# Patient Record
Sex: FEMALE | Race: WHITE | NOT HISPANIC OR LATINO | Employment: FULL TIME | URBAN - METROPOLITAN AREA
[De-identification: names, ages, dates, MRNs, and addresses within clinical notes are randomized per-mention and may not be internally consistent; named-entity substitution may affect disease eponyms.]

---

## 2019-07-14 ENCOUNTER — COMMUNICATION - HEALTHEAST (OUTPATIENT)
Dept: SCHEDULING | Facility: CLINIC | Age: 34
End: 2019-07-14

## 2021-05-25 ENCOUNTER — RECORDS - HEALTHEAST (OUTPATIENT)
Dept: ADMINISTRATIVE | Facility: CLINIC | Age: 36
End: 2021-05-25

## 2021-05-30 NOTE — TELEPHONE ENCOUNTER
"15 Weeks pregnant, YARELI 1/3/2020    Abdominal pain began last night at 2000, gradual onset, and is getting worse. Is now waking her up from sleep and she is feeling like she can't sleep.    Thinks may be round ligament pain, following some research, but is worried because it is getting much worse    Rates pain 6/10 and increasing  -Located: was on both sides of pelvic area, now on primarily left  -Feels \"pully or stretchy and then every now and then a sharp stabbing pain\"  -Does not radiate to any other part of the body  -Sitting and standing feel better than laying down, laying down makes it worse    Possible constipation, it has been 2 days since last BM. Patient states she usually goes every day.     No vaginal bleeding   No nausea/vomiting   No fever  No diarrhea  No urinary symptoms  No abdominal injury    Triaged to a disposition of Go to ED Now. Patient is agreeable. Patient is an [facility] Patient.     Reason for Disposition    MODERATE-SEVERE abdominal pain (e.g., interferes with normal activities, awakens from sleep)    Protocols used: PREGNANCY - ABDOMINAL PAIN LESS THAN 20 WEEKS NISA Candelaria RN Triage Nurse Advisor [facility]    "

## 2021-06-02 ENCOUNTER — RECORDS - HEALTHEAST (OUTPATIENT)
Dept: ADMINISTRATIVE | Facility: CLINIC | Age: 36
End: 2021-06-02

## 2022-03-28 ENCOUNTER — HOSPITAL ENCOUNTER (EMERGENCY)
Facility: CLINIC | Age: 37
Discharge: HOME | End: 2022-03-28
Attending: STUDENT IN AN ORGANIZED HEALTH CARE EDUCATION/TRAINING PROGRAM | Admitting: STUDENT IN AN ORGANIZED HEALTH CARE EDUCATION/TRAINING PROGRAM
Payer: COMMERCIAL

## 2022-03-28 VITALS
SYSTOLIC BLOOD PRESSURE: 123 MMHG | TEMPERATURE: 98.6 F | WEIGHT: 126.8 LBS | DIASTOLIC BLOOD PRESSURE: 82 MMHG | HEART RATE: 81 BPM | BODY MASS INDEX: 22.47 KG/M2 | RESPIRATION RATE: 17 BRPM | HEIGHT: 63 IN | OXYGEN SATURATION: 100 %

## 2022-03-28 DIAGNOSIS — R11.0 NAUSEA: Primary | ICD-10-CM

## 2022-03-28 DIAGNOSIS — R00.2 PALPITATIONS: ICD-10-CM

## 2022-03-28 LAB
ANION GAP SERPL CALCULATED.3IONS-SCNC: 9 MMOL/L (ref 5–18)
ATRIAL RATE - MUSE: 79 BPM
BUN SERPL-MCNC: 6 MG/DL (ref 8–22)
CALCIUM SERPL-MCNC: 9.5 MG/DL (ref 8.5–10.5)
CHLORIDE BLD-SCNC: 110 MMOL/L (ref 98–107)
CO2 SERPL-SCNC: 21 MMOL/L (ref 22–31)
CREAT SERPL-MCNC: 0.67 MG/DL (ref 0.6–1.1)
DIASTOLIC BLOOD PRESSURE - MUSE: NORMAL MMHG
ERYTHROCYTE [DISTWIDTH] IN BLOOD BY AUTOMATED COUNT: 12.4 % (ref 10–15)
GFR SERPL CREATININE-BSD FRML MDRD: >90 ML/MIN/1.73M2
GLUCOSE BLD-MCNC: 96 MG/DL (ref 70–125)
HCG SERPL-ACNC: 166 MLU/ML (ref 0–4)
HCT VFR BLD AUTO: 40.7 % (ref 35–47)
HGB BLD-MCNC: 13.6 G/DL (ref 11.7–15.7)
INTERPRETATION ECG - MUSE: NORMAL
MCH RBC QN AUTO: 29.6 PG (ref 26.5–33)
MCHC RBC AUTO-ENTMCNC: 33.4 G/DL (ref 31.5–36.5)
MCV RBC AUTO: 89 FL (ref 78–100)
P AXIS - MUSE: 68 DEGREES
PLATELET # BLD AUTO: 266 10E3/UL (ref 150–450)
POTASSIUM BLD-SCNC: 3.7 MMOL/L (ref 3.5–5)
PR INTERVAL - MUSE: 146 MS
QRS DURATION - MUSE: 88 MS
QT - MUSE: 378 MS
QTC - MUSE: 433 MS
R AXIS - MUSE: 72 DEGREES
RBC # BLD AUTO: 4.59 10E6/UL (ref 3.8–5.2)
SODIUM SERPL-SCNC: 140 MMOL/L (ref 136–145)
SYSTOLIC BLOOD PRESSURE - MUSE: NORMAL MMHG
T AXIS - MUSE: 29 DEGREES
VENTRICULAR RATE- MUSE: 79 BPM
WBC # BLD AUTO: 7.4 10E3/UL (ref 4–11)

## 2022-03-28 PROCEDURE — 258N000003 HC RX IP 258 OP 636: Performed by: STUDENT IN AN ORGANIZED HEALTH CARE EDUCATION/TRAINING PROGRAM

## 2022-03-28 PROCEDURE — 84702 CHORIONIC GONADOTROPIN TEST: CPT | Performed by: STUDENT IN AN ORGANIZED HEALTH CARE EDUCATION/TRAINING PROGRAM

## 2022-03-28 PROCEDURE — 99284 EMERGENCY DEPT VISIT MOD MDM: CPT | Mod: 25

## 2022-03-28 PROCEDURE — 85027 COMPLETE CBC AUTOMATED: CPT | Performed by: STUDENT IN AN ORGANIZED HEALTH CARE EDUCATION/TRAINING PROGRAM

## 2022-03-28 PROCEDURE — 82310 ASSAY OF CALCIUM: CPT | Performed by: STUDENT IN AN ORGANIZED HEALTH CARE EDUCATION/TRAINING PROGRAM

## 2022-03-28 PROCEDURE — 36415 COLL VENOUS BLD VENIPUNCTURE: CPT | Performed by: STUDENT IN AN ORGANIZED HEALTH CARE EDUCATION/TRAINING PROGRAM

## 2022-03-28 PROCEDURE — 93005 ELECTROCARDIOGRAM TRACING: CPT

## 2022-03-28 PROCEDURE — 93005 ELECTROCARDIOGRAM TRACING: CPT | Performed by: STUDENT IN AN ORGANIZED HEALTH CARE EDUCATION/TRAINING PROGRAM

## 2022-03-28 RX ORDER — DOXYLAMINE SUCCINATE AND PYRIDOXINE HYDROCHLORIDE, DELAYED RELEASE TABLETS 10 MG/10 MG 10; 10 MG/1; MG/1
1 TABLET, DELAYED RELEASE ORAL AT BEDTIME
Qty: 14 TABLET | Refills: 0 | Status: SHIPPED | OUTPATIENT
Start: 2022-03-28 | End: 2022-04-11

## 2022-03-28 RX ADMIN — SODIUM CHLORIDE 1000 ML: 9 INJECTION, SOLUTION INTRAVENOUS at 12:04

## 2022-03-28 NOTE — ED PROVIDER NOTES
EMERGENCY DEPARTMENT ENCOUNTER       ED Course & Medical Decision Making   8:44 AM I met with the patient, obtained history, performed an initial exam, and discussed options and plan for diagnostics and treatment here in the ED.  12:32 PM Rechecked patient and updated on results. Discussed plan for discharge - patient agreeable.    Final Impression  37 year old female presents for evaluation of some palpitations and feelings of fast racing heart (but did not take pulse rate) at home earlier today.  Patient is about 5 weeks pregnant, only recently discovered she was pregnant on 3/26, has not yet seen OB/GYN, though previously followed with Dr. Khoury for her prior pregnancy.  Did report some nausea vomiting with prior pregnancy, though short-lived and occurred later than this.  Denies any lower abdominal pain or vaginal bleeding.  Heart rate in the 70s on her EKG here, shows normal sinus rhythm, no signs of acute ischemia.  Heart rate in the 70s-90s throughout my visit with patient, no acute distress.  CBC and BMP grossly normal.  Quant hCG returned at 166.  Quant was discussed with patient, on the lower end of normal, though still technically within the normal range for her approximate 5 weeks gestation, discussed that the more important thing was that it is doubling every 48-72 hours.  No current nausea or vomiting.  Given 1 L fluid bolus while in the ED.  Did discuss first trialing with likely just to see if this helps with her morning sickness at all, though if she fails that, may likely need to progress to Zofran.  Patient agreeable to trial of Diclegis and follow-up in the OB clinic.  Will discharge home.    Prior to making a final disposition on this patient the results of patient's tests and other diagnostic studies were discussed with the patient. All questions were answered. Patient expressed understanding of the plan and was amenable to it.    Medications   0.9% sodium chloride BOLUS (0 mLs Intravenous  Stopped 3/28/22 1206)       Final Impression     1. Nausea    2. Palpitations          Chief Complaint     Chief Complaint   Patient presents with     Irregular Heart Beat     Patient experiencing heart palpations with intermittent tachycardia and N/V beginning at 0300. Pt 5 weeks pregnant. C/o cramping. No vaginal bleeding.     HPI     Fabiana Lazar is a 37 year old female who presents for evaluation of palpitations. Patient reports she is approximately 5 weeks pregnant, had positive home pregnancy test on 3/26/22 (2 days ago). Over the last few days, she reports persistent and severe nausea with vomiting that started this morning around 2:00 AM. She has been unable to keep down most oral intake the last few days. This morning, she reports she had a hot flash and was feeling heart palpitations this morning, which she attributed to the vomiting. Patient called the OB/Gyn RN triage line this morning for her morning sickness, and reports she was advised to come to the ED due to the palpitations. She denies any cardiac history. She does endorse mild abdominal cramping, but reports her nausea has resolved at this time. She notes she had around 3 weeks of mild nausea in her previous pregnancy, but never needed medications. She denies any shortness of breath or vaginal bleeding. She reports she is otherwise healthy and followed with Dr. Khoury during her previous pregnancy. She has not set up an OB/GYN yet during this previous pregnancy, but plans to see Dr. Khoury. She denies any other concerns.     I, Lul Prajapati am serving as a scribe to document services personally performed by Dr. Dex Mata MD, based on my observation and the provider's statements to me. I, Dr. Dex Mata MD attest that Lul Prajapati is acting in a scribe capacity, has observed my performance of the services and has documented them in accordance with my direction.    Past Medical History     History reviewed. No pertinent past  "medical history.  History reviewed. No pertinent surgical history.  History reviewed. No pertinent family history.   Social History     Tobacco Use     Smoking status: None     Smokeless tobacco: None   Substance Use Topics     Alcohol use: None     Drug use: None     Allergies   Allergen Reactions     Cefzil [Cefprozil] Shortness Of Breath       Relevant past medical, surgical, family and social history as documented above, has been reviewed and discussed with patient. No changes or additions, unless otherwise noted in the HPI.    Current Medications     Doxylamine-Pyridoxine (DICLEGIS) 10-10 MG TBEC        Review of Systems     Constitutional: Denies fever, chills  Eyes: Denies visual changes  Respiratory: Denies cough or shortness of breath    Cardiovascular: Denies chest pain or leg swelling. Positive for palpitations.  GI:  Denies dark, bloody stools. Positive for abdominal pain (cramping), nausea, vomiting.  : Denies hematuria, dysuria, or flank pain  Musculoskeletal: Denies any new back pain  Skin: Denies rashes or wound  Neurologic: Denies current headache, new weakness, focal weakness    Remainder of systems reviewed, unless noted in HPI all others negative.    Physical Exam     /82   Pulse 81   Temp 98.6  F (37  C) (Oral)   Resp 17   Ht 1.6 m (5' 3\")   Wt 57.5 kg (126 lb 12.8 oz)   SpO2 100%   BMI 22.46 kg/m    Constitutional: Awake, alert, in no acute distress  Head: Normocephalic, atraumatic.  ENT: Mucous membranes moist.   Eyes: Conjunctiva normal  Respiratory: Respirations even, unlabored. Lungs clear to ascultation bilaterally, in no acute respiratory distress.  Cardiovascular: Regular rate and rhythm. +2 radial pulses, equal bilaterally. No pitting edema.   GI: Abdomen soft, non-tender. No guarding or rebound. Bowel sounds intact on all 4 quadrants.   Musculoskeletal: Moves all 4 extremities equally, strength symmetrical on bilateral uppers and lowers.  Integument: Warm, " dry.  Neurologic: Alert & oriented x 3. Normal speech. Grossly normal motor and sensory function. No focal deficits noted.  Psychiatric: Normal mood and affect.     Labs & Imaging     Results for orders placed or performed during the hospital encounter of 03/28/22   Basic metabolic panel   Result Value Ref Range    Sodium 140 136 - 145 mmol/L    Potassium 3.7 3.5 - 5.0 mmol/L    Chloride 110 (H) 98 - 107 mmol/L    Carbon Dioxide (CO2) 21 (L) 22 - 31 mmol/L    Anion Gap 9 5 - 18 mmol/L    Urea Nitrogen 6 (L) 8 - 22 mg/dL    Creatinine 0.67 0.60 - 1.10 mg/dL    Calcium 9.5 8.5 - 10.5 mg/dL    Glucose 96 70 - 125 mg/dL    GFR Estimate >90 >60 mL/min/1.73m2   HCG quantitative pregnancy (blood)   Result Value Ref Range    hCG Quantitative 166 (H) 0 - 4 mlU/mL   CBC (+ platelets, no diff)   Result Value Ref Range    WBC Count 7.4 4.0 - 11.0 10e3/uL    RBC Count 4.59 3.80 - 5.20 10e6/uL    Hemoglobin 13.6 11.7 - 15.7 g/dL    Hematocrit 40.7 35.0 - 47.0 %    MCV 89 78 - 100 fL    MCH 29.6 26.5 - 33.0 pg    MCHC 33.4 31.5 - 36.5 g/dL    RDW 12.4 10.0 - 15.0 %    Platelet Count 266 150 - 450 10e3/uL   ECG 12-Lead with [facility] - [facility]   Result Value Ref Range    Systolic Blood Pressure  mmHg    Diastolic Blood Pressure  mmHg    Ventricular Rate 79 BPM    Atrial Rate 79 BPM    RI Interval 146 ms    QRS Duration 88 ms     ms    QTc 433 ms    P Axis 68 degrees    R AXIS 72 degrees    T Axis 29 degrees    Interpretation ECG       Sinus rhythm with sinus arrhythmia  Nonspecific ST abnormality  Abnormal ECG  When compared with ECG of 07-APR-2010 14:03,  No significant change was found  Confirmed by SEE ED PROVIDER NOTE FOR, ECG INTERPRETATION ([id number]),  ITZ SILVESTRE ([id number]) on 3/28/2022 9:00:22 AM       EKG     Sinus rhythm, rate 79.  .  QRS 88.  QTc 433.  No STEMI.     Dex Mata MD  03/28/22 [id number]

## 2022-03-28 NOTE — ED TRIAGE NOTES
Patient experiencing heart palpations with intermittent tachycardia and N/V beginning at 0300. Pt 5 weeks pregnant. C/o cramping. No vaginal bleeding.

## 2022-09-07 ENCOUNTER — HOSPITAL ENCOUNTER (EMERGENCY)
Facility: CLINIC | Age: 37
Discharge: HOME | End: 2022-09-07
Attending: STUDENT IN AN ORGANIZED HEALTH CARE EDUCATION/TRAINING PROGRAM | Admitting: STUDENT IN AN ORGANIZED HEALTH CARE EDUCATION/TRAINING PROGRAM
Payer: COMMERCIAL

## 2022-09-07 ENCOUNTER — APPOINTMENT (OUTPATIENT)
Dept: CT IMAGING | Facility: CLINIC | Age: 37
End: 2022-09-07
Attending: STUDENT IN AN ORGANIZED HEALTH CARE EDUCATION/TRAINING PROGRAM
Payer: COMMERCIAL

## 2022-09-07 VITALS
RESPIRATION RATE: 16 BRPM | HEART RATE: 62 BPM | DIASTOLIC BLOOD PRESSURE: 66 MMHG | TEMPERATURE: 98 F | OXYGEN SATURATION: 100 % | SYSTOLIC BLOOD PRESSURE: 110 MMHG | BODY MASS INDEX: 22.32 KG/M2 | WEIGHT: 126 LBS | HEIGHT: 63 IN

## 2022-09-07 DIAGNOSIS — K52.9 NON-SPECIFIC COLITIS: ICD-10-CM

## 2022-09-07 LAB
ALBUMIN SERPL-MCNC: 4.2 G/DL (ref 3.5–5)
ALBUMIN UR-MCNC: 10 MG/DL
ALP SERPL-CCNC: 56 U/L (ref 45–120)
ALT SERPL W P-5'-P-CCNC: <9 U/L (ref 0–45)
ANION GAP SERPL CALCULATED.3IONS-SCNC: 10 MMOL/L (ref 5–18)
APPEARANCE UR: ABNORMAL
AST SERPL W P-5'-P-CCNC: 12 U/L (ref 0–40)
BACTERIA #/AREA URNS HPF: ABNORMAL /HPF
BILIRUB SERPL-MCNC: 0.9 MG/DL (ref 0–1)
BILIRUB UR QL STRIP: NEGATIVE
BUN SERPL-MCNC: 5 MG/DL (ref 8–22)
CALCIUM SERPL-MCNC: 9.8 MG/DL (ref 8.5–10.5)
CHLORIDE BLD-SCNC: 109 MMOL/L (ref 98–107)
CO2 SERPL-SCNC: 23 MMOL/L (ref 22–31)
COLOR UR AUTO: YELLOW
CREAT SERPL-MCNC: 0.77 MG/DL (ref 0.6–1.1)
ERYTHROCYTE [DISTWIDTH] IN BLOOD BY AUTOMATED COUNT: 12.3 % (ref 10–15)
GFR SERPL CREATININE-BSD FRML MDRD: >90 ML/MIN/1.73M2
GLUCOSE BLD-MCNC: 94 MG/DL (ref 70–125)
GLUCOSE UR STRIP-MCNC: NEGATIVE MG/DL
HCG UR QL: NEGATIVE
HCT VFR BLD AUTO: 43.9 % (ref 35–47)
HGB BLD-MCNC: 14.7 G/DL (ref 11.7–15.7)
HGB UR QL STRIP: ABNORMAL
KETONES UR STRIP-MCNC: NEGATIVE MG/DL
LEUKOCYTE ESTERASE UR QL STRIP: ABNORMAL
LIPASE SERPL-CCNC: 44 U/L (ref 0–52)
MCH RBC QN AUTO: 30.5 PG (ref 26.5–33)
MCHC RBC AUTO-ENTMCNC: 33.5 G/DL (ref 31.5–36.5)
MCV RBC AUTO: 91 FL (ref 78–100)
MUCOUS THREADS #/AREA URNS LPF: PRESENT /LPF
NITRATE UR QL: NEGATIVE
PH UR STRIP: 6.5 [PH] (ref 5–7)
PLATELET # BLD AUTO: 327 10E3/UL (ref 150–450)
POTASSIUM BLD-SCNC: 3.9 MMOL/L (ref 3.5–5)
PROT SERPL-MCNC: 8 G/DL (ref 6–8)
RBC # BLD AUTO: 4.82 10E6/UL (ref 3.8–5.2)
RBC URINE: 1 /HPF
SODIUM SERPL-SCNC: 142 MMOL/L (ref 136–145)
SP GR UR STRIP: 1.01 (ref 1–1.03)
SQUAMOUS EPITHELIAL: 9 /HPF
UROBILINOGEN UR STRIP-MCNC: <2 MG/DL
WBC # BLD AUTO: 11.1 10E3/UL (ref 4–11)
WBC URINE: 10 /HPF

## 2022-09-07 PROCEDURE — 250N000011 HC RX IP 250 OP 636: Performed by: STUDENT IN AN ORGANIZED HEALTH CARE EDUCATION/TRAINING PROGRAM

## 2022-09-07 PROCEDURE — 36415 COLL VENOUS BLD VENIPUNCTURE: CPT | Performed by: EMERGENCY MEDICINE

## 2022-09-07 PROCEDURE — 81025 URINE PREGNANCY TEST: CPT | Performed by: EMERGENCY MEDICINE

## 2022-09-07 PROCEDURE — 74177 CT ABD & PELVIS W/CONTRAST: CPT

## 2022-09-07 PROCEDURE — 81001 URINALYSIS AUTO W/SCOPE: CPT | Performed by: EMERGENCY MEDICINE

## 2022-09-07 PROCEDURE — 80053 COMPREHEN METABOLIC PANEL: CPT | Performed by: EMERGENCY MEDICINE

## 2022-09-07 PROCEDURE — 96361 HYDRATE IV INFUSION ADD-ON: CPT

## 2022-09-07 PROCEDURE — 83690 ASSAY OF LIPASE: CPT | Performed by: STUDENT IN AN ORGANIZED HEALTH CARE EDUCATION/TRAINING PROGRAM

## 2022-09-07 PROCEDURE — 96374 THER/PROPH/DIAG INJ IV PUSH: CPT | Mod: 59

## 2022-09-07 PROCEDURE — 99285 EMERGENCY DEPT VISIT HI MDM: CPT | Mod: 25

## 2022-09-07 PROCEDURE — 85027 COMPLETE CBC AUTOMATED: CPT | Performed by: EMERGENCY MEDICINE

## 2022-09-07 PROCEDURE — 258N000003 HC RX IP 258 OP 636: Performed by: STUDENT IN AN ORGANIZED HEALTH CARE EDUCATION/TRAINING PROGRAM

## 2022-09-07 PROCEDURE — 87086 URINE CULTURE/COLONY COUNT: CPT | Performed by: EMERGENCY MEDICINE

## 2022-09-07 RX ORDER — IOPAMIDOL 755 MG/ML
100 INJECTION, SOLUTION INTRAVASCULAR ONCE
Status: COMPLETED | OUTPATIENT
Start: 2022-09-07 | End: 2022-09-07

## 2022-09-07 RX ORDER — ONDANSETRON 4 MG/1
4 TABLET, ORALLY DISINTEGRATING ORAL EVERY 8 HOURS PRN
Qty: 10 TABLET | Refills: 0 | Status: SHIPPED | OUTPATIENT
Start: 2022-09-07 | End: 2022-09-10

## 2022-09-07 RX ORDER — ONDANSETRON 2 MG/ML
4 INJECTION INTRAMUSCULAR; INTRAVENOUS ONCE
Status: COMPLETED | OUTPATIENT
Start: 2022-09-07 | End: 2022-09-07

## 2022-09-07 RX ADMIN — IOPAMIDOL 100 ML: 755 INJECTION, SOLUTION INTRAVENOUS at 08:36

## 2022-09-07 RX ADMIN — SODIUM CHLORIDE, POTASSIUM CHLORIDE, SODIUM LACTATE AND CALCIUM CHLORIDE 1000 ML: 600; 310; 30; 20 INJECTION, SOLUTION INTRAVENOUS at 08:22

## 2022-09-07 RX ADMIN — ONDANSETRON 4 MG: 2 INJECTION INTRAMUSCULAR; INTRAVENOUS at 08:22

## 2022-09-07 ASSESSMENT — ACTIVITIES OF DAILY LIVING (ADL): ADLS_ACUITY_SCORE: 35

## 2022-09-07 NOTE — DISCHARGE INSTRUCTIONS
Please push oral hydration at home.  Take Zofran as prescribed.  Take ibuprofen/Tylenol for pain.  Eat a bland diet.  I would also recommend taking Metamucil 1-3 times daily to help with your bowel movements as well as soothing your colon area.    If you have difficulty tolerating eating or drinking, please return to the ER for repeat evaluation along with any fevers, increased abdominal pain or any other concerning symptoms.  If you have persisting abdominal discomfort, please follow-up with your primary care doctor for a possible GI referral.

## 2022-09-07 NOTE — ED PROVIDER NOTES
Emergency Department Encounter         FINAL IMPRESSION:  Colitis        ED COURSE AND MEDICAL DECISION MAKING       ED Course as of 09/07/22 0921   Wed Sep 07, 2022   0834 Patient is an overall healthy 37-year-old here from home with nausea vomiting and left lower quadrant abdominal discomfort that is been going on for the past 3 to 4 days.  No vaginal bleeding or discharge.  No dysuria.  No [place] changes.  No fevers or chills.  On arrival she looks well.  Vitals are stable.  Mild white count.  Suggestion of UTI on urinalysis however patient denies any dysuria, polyuria or hematuria.  States he has a history of UTIs in the past and she has been quite symptomatic with them.  Examination here shows some generalized subtle left lower quadrant abdominal pain to palpation.  Negative Mejia sign.  Negative McBurney's point tenderness.  Heart and lungs otherwise normal.  Plan for labs fluids antiemetics CT reevaluate   -Labs overall reassuring.  Patient is improving with fluids and antiemetics.  Continues to decline pain medication.  Unlikely ovarian torsion considering her pain is essentially controlled.  I think having a diagnosis of colitis is what is causing her symptoms I do not think further imaging is necessary.  Denies any vaginal bleeding or discharge noted suggest PID or any other gynecologic emergency.  - Patient sent home with Zofran.  Gave return precautions.    8:30 AM I met with the patient to obtain patient history and performed a physical exam. Discussed plan for ED work up including potential diagnostic studies and interventions.         At the conclusion of the encounter I discussed the results of all the tests and the disposition. The questions were answered. The patient or family acknowledged understanding and was agreeable with the care plan.                  MEDICATIONS GIVEN IN THE EMERGENCY DEPARTMENT:  Medications   ondansetron (ZOFRAN) injection 4 mg (4 mg Intravenous Given 9/7/22 0822)    lactated ringers BOLUS 1,000 mL (1,000 mLs Intravenous New Bag 9/7/22 0822)   iopamidol (ISOVUE-370) solution 100 mL (100 mLs Intravenous Given 9/7/22 0836)       NEW PRESCRIPTIONS STARTED AT TODAY'S ED VISIT:  New Prescriptions    ONDANSETRON (ZOFRAN ODT) 4 MG ODT TAB    Take 1 tablet (4 mg) by mouth every 8 hours as needed for nausea or vomiting       HPI     Patient information obtained from: patient    Use of Interpretor: N/A     Fabiana Lazar is a 37 year old female with a pertinent history of migraines and depressive disorder who presents to this ED via self walk-in for evaluation of abdominal pain and vomiting.    Patient reports of LLQ abdominal pain that started Saturday. She describes the pain as 6/10 pain. She also reports of headache that started on Thursday, vomiting, and nausea which she associates from abdominal pain. Patient also reports arm pain. She denies change in bowel habits and any change in diet. Her pregnancy test was negative.    She has a history of migraines and is on topamax but stopped it on Friday due to symptoms of heart palpitations.         REVIEW OF SYSTEMS:  Review of Systems   Constitutional: Negative for fever, malaise  HEENT: Negative runny nose, sore throat, ear pain, neck pain  Respiratory: Negative for shortness of breath, cough, congestion  Cardiovascular: Negative for chest pain, leg edema  Gastrointestinal: Negative for abdominal distention, constipation, diarrhea, bowel habits. Positive for LLQ abdominal pain, nausea, and vomiting.  Genitourinary: Negative for dysuria and hematuria.   Integument: Negative for rash, skin breakdown  Neurological: Negative for paresthesias, weakness. Positive for headache.  Musculoskeletal: Negative for joint pain, joint swelling. Positive for arm pain      All other systems reviewed and are negative.          MEDICAL HISTORY     History reviewed. No pertinent past medical history.    History reviewed. No pertinent surgical  "history.         ondansetron (ZOFRAN ODT) 4 MG ODT tab            PHYSICAL EXAM     BP (!) 128/90   Pulse 96   Temp 98  F (36.7  C) (Temporal)   Resp 16   Ht 1.6 m (5' 3\")   Wt 57.2 kg (126 lb)   SpO2 100%   BMI 22.32 kg/m        PHYSICAL EXAM:     General: Patient appears well, nontoxic, comfortable  HEENT: Moist mucous membranes,  No head trauma.  No midline neck pain.  Cardiovascular: Normal rate, normal rhythm, no extremity edema.  No appreciable murmur.  Respiratory: No signs of respiratory distress, lungs are clear to auscultation bilaterally with no wheezes rhonchi or rales.  Abdominal: No palpable masses, no guarding, no rebound. Some generalized subtle left lower quadrant abdominal pain to palpation.  Negative Mejia sign.  Negative McBurney's point tenderness.   Musculoskeletal: Full range of motion of joints, no deformities appreciated.  Neurological: Alert and oriented, grossly neurologically intact.  Psychological: Normal affect and mood.  Integument: No rashes appreciated          RESULTS       Labs Ordered and Resulted from Time of ED Arrival to Time of ED Departure   ROUTINE UA WITH MICROSCOPIC REFLEX TO CULTURE - Abnormal       Result Value    Color Urine Yellow      Appearance Urine Turbid (*)     Glucose Urine Negative      Bilirubin Urine Negative      Ketones Urine Negative      Specific Gravity Urine 1.015      Blood Urine 0.03 mg/dL (*)     pH Urine 6.5      Protein Albumin Urine 10  (*)     Urobilinogen Urine <2.0      Nitrite Urine Negative      Leukocyte Esterase Urine 250 [place]/uL (*)     Bacteria Urine Few (*)     Mucus Urine Present (*)     RBC Urine 1      WBC Urine 10 (*)     Squamous Epithelials Urine 9 (*)    COMPREHENSIVE METABOLIC PANEL - Abnormal    Sodium 142      Potassium 3.9      Chloride 109 (*)     Carbon Dioxide (CO2) 23      Anion Gap 10      Urea Nitrogen 5 (*)     Creatinine 0.77      Calcium 9.8      Glucose 94      Alkaline Phosphatase 56      AST 12      ALT <9   "    Protein Total 8.0      Albumin 4.2      Bilirubin Total 0.9      GFR Estimate >90     CBC WITH PLATELETS - Abnormal    WBC Count 11.1 (*)     RBC Count 4.82      Hemoglobin 14.7      Hematocrit 43.9      MCV 91      MCH 30.5      MCHC 33.5      RDW 12.3      Platelet Count 327     HCG QUALITATIVE URINE - Normal    hCG Urine Qualitative Negative     LIPASE - Normal    Lipase 44     URINE CULTURE       CT Abdomen Pelvis w Contrast   Final Result   IMPRESSION:    1.  Mild left-sided colitis. This is most likely infectious or inflammatory.                        PROCEDURES:  Procedures:  Procedures       I, Naomi Heaton am serving as a scribe to document services personally performed by Leonel Baum DO, based on my observations and the provider's statements to me.  I, Leonel Baum DO, attest that Naomi Heaton is acting in a scribe capacity, has observed my performance of the services and has documented them in accordance with my direction.    Leonel Baum DO  Emergency Medicine  [facility] EMERGENCY ROOM     Leonel Baum DO  09/07/22 [id number]

## 2022-09-08 LAB — BACTERIA UR CULT: NORMAL
